# Patient Record
Sex: MALE | Race: WHITE | NOT HISPANIC OR LATINO | URBAN - METROPOLITAN AREA
[De-identification: names, ages, dates, MRNs, and addresses within clinical notes are randomized per-mention and may not be internally consistent; named-entity substitution may affect disease eponyms.]

---
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
## 2024-07-24 NOTE — DISCHARGE SUMMARY
Conjuntivae and eyelids appear normal, Sclerae : White without injection DISCHARGE SUMMARY    PATIENTS NAME: Reid Polanco    MRN: [id number]    CSN: [id number]    ADMIT DATE:  12/31/2017    DISCHARGE DATE: 1/1/2018    ADMIT MD: Juan Falcon M.D.    DISCHARGE MD: Juan Falcon MD    REASON FOR ADMIT:Skiing accident with pelvic pain    PRINCIPLE DIAGNOSIS:stable pelvic fracture    SECONDARY DIAGNOSIS:none    PROCEDURES: none    CONSULTATIONS: Juan Falcon MD     HOSPITAL COURSE: Patient is a 17 year old male visiting from [facility].  He fell after going off a jump skiing.  He was initially seen by Dr Triston Hawkins MD in the [facility].  Dr Juan Falcon MD was consulted for orthopaedics.   He felt that the nature of the patients fractures did not necessitate surgical intervention.  The patient was admitted over night for pain control and therapy. He has done well with mobilization and his pain has been well controlled with oral medications. He is now ready for DC at this time.     DISCHARGE LOCATION:home with family    DVT PROPHYLAXSIS:not indicated    ANTIBIOTICS:none    WEIGHT BEARING:toe touch weight bearing left lower extremity    FOLLOW UP: 10-14 days post operatively with Dr Juan Falcon MD    DISCHARGE DIAGNOSIS: Stable non operative pelvic fracture    MEDICATIONS:   Current Outpatient Prescriptions   Medication Sig Dispense Refill   • tramadol (ULTRAM) 50 MG Tab Take 1-2 Tabs by mouth every 6 hours as needed for Moderate Pain for up to 30 days. 90 Tab 1   • hydrocodone-acetaminophen (NORCO) 5-325 MG Tab per tablet Take 1-2 Tabs by mouth every four hours as needed for up to 30 days. 80 Tab 0